# Patient Record
Sex: MALE | URBAN - METROPOLITAN AREA
[De-identification: names, ages, dates, MRNs, and addresses within clinical notes are randomized per-mention and may not be internally consistent; named-entity substitution may affect disease eponyms.]

---

## 2017-08-11 ENCOUNTER — EMERGENCY (EMERGENCY)
Facility: HOSPITAL | Age: 52
LOS: 0 days | Discharge: HOME | End: 2017-08-11
Attending: EMERGENCY MEDICINE
Payer: COMMERCIAL

## 2017-08-11 VITALS
TEMPERATURE: 99 F | WEIGHT: 190.04 LBS | HEART RATE: 73 BPM | HEIGHT: 71 IN | DIASTOLIC BLOOD PRESSURE: 77 MMHG | RESPIRATION RATE: 20 BRPM | OXYGEN SATURATION: 98 % | SYSTOLIC BLOOD PRESSURE: 111 MMHG
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
## 2017-08-11 DIAGNOSIS — Z87.891 PERSONAL HISTORY OF NICOTINE DEPENDENCE: ICD-10-CM

## 2017-08-11 DIAGNOSIS — M25.512 PAIN IN LEFT SHOULDER: ICD-10-CM

## 2017-08-11 DIAGNOSIS — S29.011A STRAIN OF MUSCLE AND TENDON OF FRONT WALL OF THORAX, INITIAL ENCOUNTER: ICD-10-CM
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
## 2017-08-11 DIAGNOSIS — M54.9 DORSALGIA, UNSPECIFIED: ICD-10-CM

## 2017-08-11 PROCEDURE — 99284 EMERGENCY DEPT VISIT MOD MDM: CPT

## 2017-08-11 PROCEDURE — 71020: CPT | Mod: 26

## 2017-08-11 RX ORDER — IBUPROFEN 200 MG
1 TABLET ORAL
Qty: 15 | Refills: 0
Start: 2017-08-11 | End: 2017-08-16

## 2017-08-11 RX ORDER — CYCLOBENZAPRINE HYDROCHLORIDE 10 MG/1
1 TABLET, FILM COATED ORAL
Qty: 20 | Refills: 0
Start: 2017-08-11 | End: 2017-08-21

## 2017-08-11 RX ORDER — IBUPROFEN 200 MG
800 TABLET ORAL ONCE
Refills: 0 | Status: COMPLETED | OUTPATIENT
Start: 2017-08-11 | End: 2017-08-11

## 2017-08-11 RX ADMIN — Medication 800 MILLIGRAM(S): at 16:14
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
## 2023-04-08 NOTE — ED ADULT NURSE NOTE - QUALITY
You can access the FollowMyHealth Patient Portal offered by [facility] by registering at the following website: [email or web address]. By joining [facility]’s FollowMyHealth portal, you will also be able to view your health information using other applications (apps) compatible with our system.
aching

## 2025-01-12 ENCOUNTER — INPATIENT (INPATIENT)
Facility: HOSPITAL | Age: 60
LOS: 3 days | Discharge: HOME | End: 2025-01-16
Attending: STUDENT IN AN ORGANIZED HEALTH CARE EDUCATION/TRAINING PROGRAM | Admitting: STUDENT IN AN ORGANIZED HEALTH CARE EDUCATION/TRAINING PROGRAM
Payer: MEDICAID

## 2025-01-12 VITALS
HEART RATE: 74 BPM | WEIGHT: 175.05 LBS | TEMPERATURE: 98 F | SYSTOLIC BLOOD PRESSURE: 208 MMHG | OXYGEN SATURATION: 98 % | DIASTOLIC BLOOD PRESSURE: 131 MMHG | RESPIRATION RATE: 18 BRPM | HEIGHT: 71 IN

## 2025-01-12 PROCEDURE — 99285 EMERGENCY DEPT VISIT HI MDM: CPT

## 2025-01-12 PROCEDURE — 93010 ELECTROCARDIOGRAM REPORT: CPT

## 2025-01-12 NOTE — ED ADULT TRIAGE NOTE - CHIEF COMPLAINT QUOTE
pt c/o left lower leg swelling x3 days.  pt states that he is on his feet all day for work.  pt noted to have elevated /131, denies any chest pain, blurry vision, headaches.  Ambulatory with steady gait  pt denies any medical hx. nkda

## 2025-01-13 DIAGNOSIS — E11.65 TYPE 2 DIABETES MELLITUS WITH HYPERGLYCEMIA: ICD-10-CM

## 2025-01-13 DIAGNOSIS — N17.9 ACUTE KIDNEY FAILURE, UNSPECIFIED: ICD-10-CM

## 2025-01-13 DIAGNOSIS — R79.89 OTHER SPECIFIED ABNORMAL FINDINGS OF BLOOD CHEMISTRY: ICD-10-CM

## 2025-01-13 LAB
ALBUMIN SERPL ELPH-MCNC: 3.4 G/DL — SIGNIFICANT CHANGE UP (ref 3.3–5)
ALP SERPL-CCNC: 162 U/L — HIGH (ref 40–120)
ALT FLD-CCNC: 44 U/L — SIGNIFICANT CHANGE UP (ref 12–78)
ANION GAP SERPL CALC-SCNC: 9 MMOL/L — SIGNIFICANT CHANGE UP (ref 5–17)
AST SERPL-CCNC: 32 U/L — SIGNIFICANT CHANGE UP (ref 15–37)
BASOPHILS # BLD AUTO: 0.02 K/UL — SIGNIFICANT CHANGE UP (ref 0–0.2)
BASOPHILS NFR BLD AUTO: 0.5 % — SIGNIFICANT CHANGE UP (ref 0–2)
BILIRUB SERPL-MCNC: 0.6 MG/DL — SIGNIFICANT CHANGE UP (ref 0.2–1.2)
BUN SERPL-MCNC: 32 MG/DL — HIGH (ref 7–23)
CALCIUM SERPL-MCNC: 9 MG/DL — SIGNIFICANT CHANGE UP (ref 8.5–10.1)
CHLORIDE SERPL-SCNC: 95 MMOL/L — LOW (ref 96–108)
CO2 SERPL-SCNC: 24 MMOL/L — SIGNIFICANT CHANGE UP (ref 22–31)
CREAT SERPL-MCNC: 2.15 MG/DL — HIGH (ref 0.5–1.3)
EGFR: 34 ML/MIN/1.73M2 — LOW
EOSINOPHIL # BLD AUTO: 0.02 K/UL — SIGNIFICANT CHANGE UP (ref 0–0.5)
EOSINOPHIL NFR BLD AUTO: 0.5 % — SIGNIFICANT CHANGE UP (ref 0–6)
GLUCOSE BLDC GLUCOMTR-MCNC: 119 MG/DL — HIGH (ref 70–99)
GLUCOSE BLDC GLUCOMTR-MCNC: 254 MG/DL — HIGH (ref 70–99)
GLUCOSE BLDC GLUCOMTR-MCNC: 259 MG/DL — HIGH (ref 70–99)
GLUCOSE BLDC GLUCOMTR-MCNC: 96 MG/DL — SIGNIFICANT CHANGE UP (ref 70–99)
GLUCOSE SERPL-MCNC: 581 MG/DL — CRITICAL HIGH (ref 70–99)
HCT VFR BLD CALC: 34.2 % — LOW (ref 39–50)
HGB BLD-MCNC: 12.5 G/DL — LOW (ref 13–17)
IMM GRANULOCYTES NFR BLD AUTO: 0 % — SIGNIFICANT CHANGE UP (ref 0–0.9)
LYMPHOCYTES # BLD AUTO: 1.44 K/UL — SIGNIFICANT CHANGE UP (ref 1–3.3)
LYMPHOCYTES # BLD AUTO: 34.6 % — SIGNIFICANT CHANGE UP (ref 13–44)
MCHC RBC-ENTMCNC: 30.4 PG — SIGNIFICANT CHANGE UP (ref 27–34)
MCHC RBC-ENTMCNC: 36.5 G/DL — HIGH (ref 32–36)
MCV RBC AUTO: 83.2 FL — SIGNIFICANT CHANGE UP (ref 80–100)
MONOCYTES # BLD AUTO: 0.4 K/UL — SIGNIFICANT CHANGE UP (ref 0–0.9)
MONOCYTES NFR BLD AUTO: 9.6 % — SIGNIFICANT CHANGE UP (ref 2–14)
NEUTROPHILS # BLD AUTO: 2.28 K/UL — SIGNIFICANT CHANGE UP (ref 1.8–7.4)
NEUTROPHILS NFR BLD AUTO: 54.8 % — SIGNIFICANT CHANGE UP (ref 43–77)
NRBC # BLD: 0 /100 WBCS — SIGNIFICANT CHANGE UP (ref 0–0)
NT-PROBNP SERPL-SCNC: 1011 PG/ML — HIGH (ref 0–125)
PLATELET # BLD AUTO: 252 K/UL — SIGNIFICANT CHANGE UP (ref 150–400)
POTASSIUM SERPL-MCNC: 3.9 MMOL/L — SIGNIFICANT CHANGE UP (ref 3.5–5.3)
POTASSIUM SERPL-SCNC: 3.9 MMOL/L — SIGNIFICANT CHANGE UP (ref 3.5–5.3)
PROT SERPL-MCNC: 7.8 GM/DL — SIGNIFICANT CHANGE UP (ref 6–8.3)
RBC # BLD: 4.11 M/UL — LOW (ref 4.2–5.8)
RBC # FLD: 11.8 % — SIGNIFICANT CHANGE UP (ref 10.3–14.5)
SODIUM SERPL-SCNC: 128 MMOL/L — LOW (ref 135–145)
TROPONIN I, HIGH SENSITIVITY RESULT: 28.1 NG/L — SIGNIFICANT CHANGE UP
WBC # BLD: 4.16 K/UL — SIGNIFICANT CHANGE UP (ref 3.8–10.5)
WBC # FLD AUTO: 4.16 K/UL — SIGNIFICANT CHANGE UP (ref 3.8–10.5)

## 2025-01-13 PROCEDURE — 99223 1ST HOSP IP/OBS HIGH 75: CPT

## 2025-01-13 PROCEDURE — 76770 US EXAM ABDO BACK WALL COMP: CPT | Mod: 26

## 2025-01-13 PROCEDURE — 93970 EXTREMITY STUDY: CPT | Mod: 26

## 2025-01-13 PROCEDURE — 93306 TTE W/DOPPLER COMPLETE: CPT | Mod: 26

## 2025-01-13 PROCEDURE — 99232 SBSQ HOSP IP/OBS MODERATE 35: CPT

## 2025-01-13 PROCEDURE — 71046 X-RAY EXAM CHEST 2 VIEWS: CPT | Mod: 26

## 2025-01-13 RX ORDER — DEXTROSE MONOHYDRATE 25 G/50ML
25 INJECTION, SOLUTION INTRAVENOUS ONCE
Refills: 0 | Status: DISCONTINUED | OUTPATIENT
Start: 2025-01-13 | End: 2025-01-16

## 2025-01-13 RX ORDER — GINKGO BILOBA 40 MG
3 CAPSULE ORAL AT BEDTIME
Refills: 0 | Status: DISCONTINUED | OUTPATIENT
Start: 2025-01-13 | End: 2025-01-16

## 2025-01-13 RX ORDER — INSULIN LISPRO 100/ML
VIAL (ML) SUBCUTANEOUS
Refills: 0 | Status: DISCONTINUED | OUTPATIENT
Start: 2025-01-13 | End: 2025-01-16

## 2025-01-13 RX ORDER — GLUCAGON INJECTION, SOLUTION 0.5 MG/.1ML
1 INJECTION, SOLUTION SUBCUTANEOUS ONCE
Refills: 0 | Status: DISCONTINUED | OUTPATIENT
Start: 2025-01-13 | End: 2025-01-16

## 2025-01-13 RX ORDER — SODIUM CHLORIDE 9 MG/ML
1000 INJECTION, SOLUTION INTRAMUSCULAR; INTRAVENOUS; SUBCUTANEOUS ONCE
Refills: 0 | Status: COMPLETED | OUTPATIENT
Start: 2025-01-13 | End: 2025-01-13

## 2025-01-13 RX ORDER — SODIUM CHLORIDE 9 MG/ML
1000 INJECTION, SOLUTION INTRAVENOUS
Refills: 0 | Status: DISCONTINUED | OUTPATIENT
Start: 2025-01-13 | End: 2025-01-16

## 2025-01-13 RX ORDER — ACETAMINOPHEN 80 MG/.8ML
650 SOLUTION/ DROPS ORAL EVERY 6 HOURS
Refills: 0 | Status: DISCONTINUED | OUTPATIENT
Start: 2025-01-13 | End: 2025-01-16

## 2025-01-13 RX ORDER — INSULIN GLARGINE-YFGN 100 [IU]/ML
10 INJECTION, SOLUTION SUBCUTANEOUS EVERY MORNING
Refills: 0 | Status: DISCONTINUED | OUTPATIENT
Start: 2025-01-14 | End: 2025-01-14

## 2025-01-13 RX ORDER — DEXTROSE MONOHYDRATE 25 G/50ML
12.5 INJECTION, SOLUTION INTRAVENOUS ONCE
Refills: 0 | Status: DISCONTINUED | OUTPATIENT
Start: 2025-01-13 | End: 2025-01-16

## 2025-01-13 RX ORDER — HEPARIN SODIUM 1000 [USP'U]/ML
5000 INJECTION, SOLUTION INTRAVENOUS; SUBCUTANEOUS EVERY 8 HOURS
Refills: 0 | Status: DISCONTINUED | OUTPATIENT
Start: 2025-01-13 | End: 2025-01-16

## 2025-01-13 RX ORDER — INSULIN LISPRO 100/ML
3 VIAL (ML) SUBCUTANEOUS
Refills: 0 | Status: DISCONTINUED | OUTPATIENT
Start: 2025-01-13 | End: 2025-01-14

## 2025-01-13 RX ORDER — INSULIN LISPRO 100/ML
VIAL (ML) SUBCUTANEOUS AT BEDTIME
Refills: 0 | Status: DISCONTINUED | OUTPATIENT
Start: 2025-01-13 | End: 2025-01-16

## 2025-01-13 RX ORDER — DEXTROSE MONOHYDRATE 25 G/50ML
15 INJECTION, SOLUTION INTRAVENOUS ONCE
Refills: 0 | Status: DISCONTINUED | OUTPATIENT
Start: 2025-01-13 | End: 2025-01-16

## 2025-01-13 RX ORDER — INSULIN LISPRO 100/ML
6 VIAL (ML) SUBCUTANEOUS ONCE
Refills: 0 | Status: DISCONTINUED | OUTPATIENT
Start: 2025-01-13 | End: 2025-01-13

## 2025-01-13 RX ORDER — INSULIN LISPRO 100/ML
10 VIAL (ML) SUBCUTANEOUS ONCE
Refills: 0 | Status: COMPLETED | OUTPATIENT
Start: 2025-01-13 | End: 2025-01-13

## 2025-01-13 RX ADMIN — ACETAMINOPHEN 650 MILLIGRAM(S): 80 SOLUTION/ DROPS ORAL at 18:49

## 2025-01-13 RX ADMIN — Medication 10 UNIT(S): at 04:25

## 2025-01-13 RX ADMIN — Medication 3: at 18:49

## 2025-01-13 RX ADMIN — Medication 5 MILLIGRAM(S): at 04:25

## 2025-01-13 RX ADMIN — SODIUM CHLORIDE 1000 MILLILITER(S): 9 INJECTION, SOLUTION INTRAMUSCULAR; INTRAVENOUS; SUBCUTANEOUS at 05:12

## 2025-01-13 RX ADMIN — SODIUM CHLORIDE 100 MILLILITER(S): 9 INJECTION, SOLUTION INTRAVENOUS at 23:37

## 2025-01-13 RX ADMIN — SODIUM CHLORIDE 100 MILLILITER(S): 9 INJECTION, SOLUTION INTRAVENOUS at 19:30

## 2025-01-13 RX ADMIN — Medication 5 MILLIGRAM(S): at 20:02

## 2025-01-13 RX ADMIN — Medication 1: at 23:37

## 2025-01-13 RX ADMIN — Medication 3 UNIT(S): at 18:48

## 2025-01-13 NOTE — H&P ADULT - PROBLEM SELECTOR PLAN 4
elevated BNP, LE edema  LE doppler with no DVT  Lungs clear  Check ECHO to evaluate for CHF  Monitor for volume overload

## 2025-01-13 NOTE — ED ADULT NURSE NOTE - ED STAT RN HANDOFF DETAILS 3
handoff report given to 1 B CONSTANTINO glass. All pending orders endorsed, safety precautions maintained.

## 2025-01-13 NOTE — ED ADULT NURSE NOTE - ED STAT RN HANDOFF DETAILS
Report endorsed to oncoming RN  norma for my break coverage. Report given to covering RN and patient informed during rounding Safety checks compld this shift/Safety rounds completed hourly.  Fall +skin precs in place. Any issues endorsed to oncoming RN for follow up. RN Assumed patient's care for coverage.

## 2025-01-13 NOTE — H&P ADULT - NSHPPHYSICALEXAM_GEN_ALL_CORE
CONSTITUTIONAL: alert and cooperative, no acute distress  EYES: PERRL,  no scleral icterus  ENT: Mucosa moist, tongue normal.  NECK: Neck supple, trachea midline, non-tender  CARDIAC: Normal S1 and S2. Regular rate and rhythms.  Pedal edema++  LUNGS: Equal air entry both lungs. No rales, rhonchi, wheezing. Normal respiratory effort.   ABDOMEN: Soft, nondistended, nontender. No guarding or rebound tenderness. No hepatomegaly or splenomegaly. Bowel sound normal  MUSCULOSKELETAL: Normocephalic, atraumatic. No significant deformity or joint abnormality  NEUROLOGICAL: No gross motor or sensory deficits.  SKIN: no lesions or eruptions. Normal turgor  PSYCHIATRIC: A&O x 3, appropriate mood and affect.

## 2025-01-13 NOTE — CONSULT NOTE ADULT - PROBLEM SELECTOR RECOMMENDATION 9
currently with better finger sticks   Continue with the current  regimen while inpatient   while inpatient, finger sticks should be 100-180   Encourage more nutrition   Thank You for the courtesy of this consultation !!!

## 2025-01-13 NOTE — ED ADULT NURSE NOTE - OBJECTIVE STATEMENT
Pt AAOx4. 59 year old male presents to ED with complaint of bilateral leg swelling that has been worsening over the past several days. Patient states he walks a lot at work and also about 5-6 miles daily.  No history of CHF or renal disease or DVTs.  Denies calf pain or fall/trauma. Swelling noted to bilateral lower extremities. Respirations equal and unlabored. No acute distress noted at this time.

## 2025-01-13 NOTE — H&P ADULT - PROBLEM SELECTOR PLAN 1
polydipsia, polyuria. Significantly elevated glucose of 581  Calculated serum osmolality 300  CXR  ( I personally review) no focal consolidation  New onset DM with hyperglycemia.  Significant improvement with insulin  continue with basal 10 unit, premeal 3 unit tid, ISS, hypoglycemia protocol  Check A1c  IV fluid. Closely monitor for volume overload  Endo consult---> Dr Chacon

## 2025-01-13 NOTE — PATIENT PROFILE ADULT - FALL HARM RISK - RISK INTERVENTIONS

## 2025-01-13 NOTE — ED PROVIDER NOTE - PHYSICAL EXAMINATION
On Physical Exam:  General: well appearing, in NAD, speaking clearly in full sentences and without difficulty; cooperative with exam  HEENT: anicteric sclera, airway patent  Neck: no neck tenderness, no nuchal rigidity  Cardiac: normal s1, s2; RRR; no MGR  Lungs: CTABL  Abdomen: soft nontender/nondistended  : no bladder tenderness or distension  Skin: intact, no rash  Extremities: 2+ bilateral pitting peripheral edema up to pretibial region, L worse than R, no gross deformities; dp/pt pulses palpable bilaterally, no rashes

## 2025-01-13 NOTE — ED ADULT NURSE NOTE - ED STAT RN HANDOFF DETAILS 2
Report endorsed to oncoming RN can . Safety checks compld this shift/Safety rounds completed hourly.  IV sites checked Q2+remains WDL. Meds given as ord with no s/s of adverse RXNs. Fall +skin precs in place. Any issues endorsed to oncoming RN for follow up.

## 2025-01-13 NOTE — H&P ADULT - PROBLEM SELECTOR PLAN 3
Hypertensive to 201/101, improvement with oral amlodipine  Continue amlodipine 5mg daily  Monitor BP and titrate BP med

## 2025-01-13 NOTE — ED PROVIDER NOTE - OBJECTIVE STATEMENT
Attending note (León): 59-year-old male history of borderline diabetes presenting with worsening swelling of bilateral lower extremities over past several days worse with standing.  No history of CHF or renal disease or DVTs.  Denies calf pain or fall/trauma.

## 2025-01-13 NOTE — H&P ADULT - ASSESSMENT
59 years old male with h/o prediabetes present to ED with complain of generalized weakness, polydipsia, polyuria and LE swelling. Patient reported polydipsia and polyuria for 2 weeks, associated with LE swelling. No orthopnea. No chest pain or SOB  Hypertensive to 201/101, afebrile, sat well at RA. No leukocytosis, plt 252, glucose 581, Cr 2.15, Na 128 ( corrected Na for hyperglycemia 140), proBNP 1011. CXR with no focal consolidation. LE doppler with no DVT. EKG with NSR, QTc 406

## 2025-01-13 NOTE — CONSULT NOTE ADULT - SUBJECTIVE AND OBJECTIVE BOX
The patient should be admitted to hospital medicine (discussed with Joyce).
  Patient is a 59y old  Male who presents with a chief complaint of DM with hyperglycemia, OLGA, hypertensive urgency (13 Jan 2025 12:56)      Reason For Consult: hyperglycemia      HPI:  59 years old male with h/o prediabetes present to ED with complain of generalized weakness, polydipsia, polyuria and LE swelling. Patient reported polydipsia and polyuria for 2 weeks, associated with LE swelling. No orthopnea. No chest pain or SOB  Hypertensive to 201/101, afebrile, sat well at RA. No leukocytosis, plt 252, glucose 581, Cr 2.15, Na 128 ( corrected Na for hyperglycemia 140), proBNP 1011. CXR with no focal consolidation. LE doppler with no DVT. EKG with NSR, QTc 406   (13 Jan 2025 12:56)  Patient has new onset diabetes, HbA1c not available.  Currently put on 10 units of Lantus and 3 units of prandial lispro.  Currently her fingersticks were >400 but upon increased insulin administration hypoglycemia ensued with fingersticks dropping to 80 and below.  Currently with his current regimen fingersticks are in the 100s    PAST MEDICAL & SURGICAL HISTORY:  No pertinent past medical history          FAMILY HISTORY:        Social History:    MEDICATIONS  (STANDING):  amLODIPine   Tablet 5 milliGRAM(s) Oral daily  dextrose 5%. 1000 milliLiter(s) (100 mL/Hr) IV Continuous <Continuous>  dextrose 5%. 1000 milliLiter(s) (50 mL/Hr) IV Continuous <Continuous>  dextrose 50% Injectable 25 Gram(s) IV Push once  dextrose 50% Injectable 12.5 Gram(s) IV Push once  dextrose 50% Injectable 25 Gram(s) IV Push once  glucagon  Injectable 1 milliGRAM(s) IntraMuscular once  heparin   Injectable 5000 Unit(s) SubCutaneous every 8 hours  insulin lispro (ADMELOG) corrective regimen sliding scale   SubCutaneous three times a day before meals  insulin lispro (ADMELOG) corrective regimen sliding scale   SubCutaneous at bedtime  insulin lispro Injectable (ADMELOG) 3 Unit(s) SubCutaneous three times a day before meals  lactated ringers. 1000 milliLiter(s) (100 mL/Hr) IV Continuous <Continuous>    MEDICATIONS  (PRN):  acetaminophen     Tablet .. 650 milliGRAM(s) Oral every 6 hours PRN Mild Pain (1 - 3), Moderate Pain (4 - 6)  dextrose Oral Gel 15 Gram(s) Oral once PRN Blood Glucose LESS THAN 70 milliGRAM(s)/deciliter  melatonin 3 milliGRAM(s) Oral at bedtime PRN Insomnia      REVIEW OF SYSTEMS:  CONSTITUTIONAL:  as per HPI  HEENT:  Eyes:  No diplopia or blurred vision.   ENT:  No earache, sore throat or runny nose.  CARDIOVASCULAR:  No chest pain .  RESPIRATORY:  No cough, shortness of breath, PND or orthopnea.  GASTROINTESTINAL:  No nausea, vomiting or diarrhea.  GENITOURINARY:  No dysuria, frequency or urgency. No Blood in urine  MUSCULOSKELETAL:  no joint aches, no muscle pain, myalgia  SKIN:  No change in skin, hair or nails.  NEUROLOGIC:  No paresthesias, fasciculations, seizures or weakness.  PSYCHIATRIC:  No disorder of thought or mood.  ENDOCRINE:  No heat or cold intolerance, polyuria or polydipsia. abnormal weight gain or loss, oral thrush  HEMATOLOGICAL:  No easy bruising or bleeding.     T(C): 36.5 (01-13-25 @ 21:25), Max: 36.7 (01-13-25 @ 04:29)  HR: 79 (01-13-25 @ 22:17) (58 - 85)  BP: 173/94 (01-13-25 @ 22:17) (150/74 - 201/101)  RR: 18 (01-13-25 @ 21:25) (17 - 18)  SpO2: 100% (01-13-25 @ 21:25) (96% - 100%)  Wt(kg): --    PHYSICAL EXAM:  GENERAL: NAD, well-groomed, well-developed  HEAD:  Atraumatic, Normocephalic  EYES: PERRLA, conjunctiva and sclera clear  ENMT: No  exudates,, Moist mucous membranes,, No lesions  NECK: Supple, No JVD,   NERVOUS SYSTEM:  Alert & Oriented   CHEST/LUNG: Clear to percussion bilaterally; No rales, rhonchi, wheezing, or rubs  HEART: Regular rate and rhythm; No murmurs, rubs, or gallops  ABDOMEN: Soft, Nontender, Nondistended; Bowel sounds present  EXTREMITIES:  2+ Peripheral Pulses, No clubbing, cyanosis, or edema  LYMPH: No lymphadenopathy noted  SKIN: No rashes or lesions    CAPILLARY BLOOD GLUCOSE      POCT Blood Glucose.: 254 mg/dL (13 Jan 2025 22:07)  POCT Blood Glucose.: 259 mg/dL (13 Jan 2025 18:31)  POCT Blood Glucose.: 119 mg/dL (13 Jan 2025 12:35)  POCT Blood Glucose.: 96 mg/dL (13 Jan 2025 09:58)  POCT Blood Glucose.: 574 mg/dL (13 Jan 2025 03:53)                            12.5   4.16  )-----------( 252      ( 13 Jan 2025 02:47 )             34.2       CMP:  01-13 @ 02:47  SGPT 44  Albumin 3.4   Alk Phos 162   Anion Gap 9   SGOT 32   Total Bili 0.6   BUN 32   Calcium Total 9.0   CO2 24   Chloride 95   Creatinine 2.15   eGFR if AA --   eGFR if non AA --   Glucose 581   Potassium 3.9   Protein 7.8   Sodium 128      Thyroid Function Tests:      Diabetes Tests:     Parathyroids:     Adrenals:       Radiology:

## 2025-01-13 NOTE — ED PROVIDER NOTE - CLINICAL SUMMARY MEDICAL DECISION MAKING FREE TEXT BOX
Attending note (León): bilateral LE swelling, no prior h/o DVT/CKD/CHF; obtain screening labs cbc/cmp (low suspicion infection, more likely vascular insuffiencey, renal dysfunction, less likely HF (no JVD or rales); lower suspicion DVT (obtain US r/o dvt); likely dc. Attending note (León): bilateral LE swelling, no prior h/o DVT/CKD/CHF; obtain screening labs cbc/cmp (low suspicion infection, more likely vascular insuffiencey, renal dysfunction, less likely HF (no JVD or rales); lower suspicion DVT (obtain US r/o dvt); likely dc.    ED Course: Labs reviewed patient noted to have mild hyponatremia of though glucose is 581.  Significant hyperglycemia but anion gap 9, not consistent with DKA.  Also noted to have mildly elevated proBNP but evidence for OLGA with BUN 32 and creatinine 2.15 with no recent baseline.  Given worsening swelling concern that this could be renally remediated though cannot exclude element of heart failure.  Ultrasound does not show evidence of DVT in either lower extremity.  At this time will trial fluid bolus given hyperglycemia and OLGA.  Will also amlodipine for blood pressure and have a log 6 units, though likely will require additional medications.  Given degree of derangements limited follow-up evidence for acute renal injury we will plan for admission for blood pressure and glycemic control.

## 2025-01-13 NOTE — ED ADULT NURSE NOTE - NSFALLUNIVINTERV_ED_ALL_ED
Bed/Stretcher in lowest position, wheels locked, appropriate side rails in place/Call bell, personal items and telephone in reach/Instruct patient to call for assistance before getting out of bed/chair/stretcher/Non-slip footwear applied when patient is off stretcher/[place] to call system/Physically safe environment - no spills, clutter or unnecessary equipment/Purposeful proactive rounding/Room/bathroom lighting operational, light cord in reach

## 2025-01-13 NOTE — H&P ADULT - PROBLEM SELECTOR PLAN 2
Cr 2.15. No prior renal function on file  Likely pre-renal  Gentle IV fluid  Closely monitor renal function. Closely monitor for volume overload  check urine lytes, kidney/bladder ultrasound, protein/Cr ratio, UA

## 2025-01-14 LAB
ALBUMIN SERPL ELPH-MCNC: 3.3 G/DL — SIGNIFICANT CHANGE UP (ref 3.3–5)
ALP SERPL-CCNC: 148 U/L — HIGH (ref 40–120)
ALT FLD-CCNC: 37 U/L — SIGNIFICANT CHANGE UP (ref 12–78)
ANION GAP SERPL CALC-SCNC: 9 MMOL/L — SIGNIFICANT CHANGE UP (ref 5–17)
APPEARANCE UR: CLEAR — SIGNIFICANT CHANGE UP
AST SERPL-CCNC: 31 U/L — SIGNIFICANT CHANGE UP (ref 15–37)
BACTERIA # UR AUTO: ABNORMAL /HPF
BILIRUB SERPL-MCNC: 0.6 MG/DL — SIGNIFICANT CHANGE UP (ref 0.2–1.2)
BILIRUB UR-MCNC: NEGATIVE — SIGNIFICANT CHANGE UP
BUN SERPL-MCNC: 32 MG/DL — HIGH (ref 7–23)
CALCIUM SERPL-MCNC: 9.7 MG/DL — SIGNIFICANT CHANGE UP (ref 8.5–10.1)
CHLORIDE SERPL-SCNC: 102 MMOL/L — SIGNIFICANT CHANGE UP (ref 96–108)
CHOLEST SERPL-MCNC: 181 MG/DL — SIGNIFICANT CHANGE UP
CO2 SERPL-SCNC: 24 MMOL/L — SIGNIFICANT CHANGE UP (ref 22–31)
COLOR SPEC: YELLOW — SIGNIFICANT CHANGE UP
CREAT SERPL-MCNC: 1.99 MG/DL — HIGH (ref 0.5–1.3)
DIFF PNL FLD: NEGATIVE — SIGNIFICANT CHANGE UP
EGFR: 38 ML/MIN/1.73M2 — LOW
EPI CELLS # UR: PRESENT
GLUCOSE BLDC GLUCOMTR-MCNC: 241 MG/DL — HIGH (ref 70–99)
GLUCOSE BLDC GLUCOMTR-MCNC: 294 MG/DL — HIGH (ref 70–99)
GLUCOSE BLDC GLUCOMTR-MCNC: 319 MG/DL — HIGH (ref 70–99)
GLUCOSE BLDC GLUCOMTR-MCNC: 360 MG/DL — HIGH (ref 70–99)
GLUCOSE SERPL-MCNC: 266 MG/DL — HIGH (ref 70–99)
GLUCOSE UR QL: 500 MG/DL
HCT VFR BLD CALC: 36.9 % — LOW (ref 39–50)
HDLC SERPL-MCNC: 68 MG/DL — SIGNIFICANT CHANGE UP
HGB BLD-MCNC: 13.4 G/DL — SIGNIFICANT CHANGE UP (ref 13–17)
KETONES UR-MCNC: NEGATIVE MG/DL — SIGNIFICANT CHANGE UP
LEUKOCYTE ESTERASE UR-ACNC: NEGATIVE — SIGNIFICANT CHANGE UP
LIPID PNL WITH DIRECT LDL SERPL: 93 MG/DL — SIGNIFICANT CHANGE UP
MAGNESIUM SERPL-MCNC: 1.8 MG/DL — SIGNIFICANT CHANGE UP (ref 1.6–2.6)
MCHC RBC-ENTMCNC: 30.9 PG — SIGNIFICANT CHANGE UP (ref 27–34)
MCHC RBC-ENTMCNC: 36.3 G/DL — HIGH (ref 32–36)
MCV RBC AUTO: 85 FL — SIGNIFICANT CHANGE UP (ref 80–100)
NITRITE UR-MCNC: NEGATIVE — SIGNIFICANT CHANGE UP
NON HDL CHOLESTEROL: 113 MG/DL — SIGNIFICANT CHANGE UP
NRBC # BLD: 0 /100 WBCS — SIGNIFICANT CHANGE UP (ref 0–0)
PH UR: 6.5 — SIGNIFICANT CHANGE UP (ref 5–8)
PHOSPHATE SERPL-MCNC: 3.3 MG/DL — SIGNIFICANT CHANGE UP (ref 2.5–4.5)
PLATELET # BLD AUTO: 301 K/UL — SIGNIFICANT CHANGE UP (ref 150–400)
POTASSIUM SERPL-MCNC: 3.5 MMOL/L — SIGNIFICANT CHANGE UP (ref 3.5–5.3)
POTASSIUM SERPL-SCNC: 3.5 MMOL/L — SIGNIFICANT CHANGE UP (ref 3.5–5.3)
PROT SERPL-MCNC: 7.7 GM/DL — SIGNIFICANT CHANGE UP (ref 6–8.3)
PROT UR-MCNC: SIGNIFICANT CHANGE UP MG/DL
RBC # BLD: 4.34 M/UL — SIGNIFICANT CHANGE UP (ref 4.2–5.8)
RBC # FLD: 12 % — SIGNIFICANT CHANGE UP (ref 10.3–14.5)
RBC CASTS # UR COMP ASSIST: 1 /HPF — SIGNIFICANT CHANGE UP (ref 0–4)
SODIUM SERPL-SCNC: 135 MMOL/L — SIGNIFICANT CHANGE UP (ref 135–145)
SP GR SPEC: 1.01 — SIGNIFICANT CHANGE UP (ref 1–1.03)
TRIGL SERPL-MCNC: 111 MG/DL — SIGNIFICANT CHANGE UP
UROBILINOGEN FLD QL: 0.2 MG/DL — SIGNIFICANT CHANGE UP (ref 0.2–1)
WBC # BLD: 4.61 K/UL — SIGNIFICANT CHANGE UP (ref 3.8–10.5)
WBC # FLD AUTO: 4.61 K/UL — SIGNIFICANT CHANGE UP (ref 3.8–10.5)
WBC UR QL: 1 /HPF — SIGNIFICANT CHANGE UP (ref 0–5)

## 2025-01-14 PROCEDURE — 99232 SBSQ HOSP IP/OBS MODERATE 35: CPT

## 2025-01-14 RX ORDER — MORPHINE SULFATE 15 MG
2 TABLET, EXTENDED RELEASE ORAL ONCE
Refills: 0 | Status: DISCONTINUED | OUTPATIENT
Start: 2025-01-14 | End: 2025-01-14

## 2025-01-14 RX ORDER — NIFEDIPINE 60 MG/1
60 TABLET, EXTENDED RELEASE ORAL DAILY
Refills: 0 | Status: DISCONTINUED | OUTPATIENT
Start: 2025-01-14 | End: 2025-01-16

## 2025-01-14 RX ORDER — INSULIN GLARGINE-YFGN 100 [IU]/ML
13 INJECTION, SOLUTION SUBCUTANEOUS EVERY MORNING
Refills: 0 | Status: DISCONTINUED | OUTPATIENT
Start: 2025-01-15 | End: 2025-01-16

## 2025-01-14 RX ORDER — INSULIN LISPRO 100/ML
6 VIAL (ML) SUBCUTANEOUS
Refills: 0 | Status: DISCONTINUED | OUTPATIENT
Start: 2025-01-14 | End: 2025-01-16

## 2025-01-14 RX ORDER — LIDOCAINE 50 MG/G
1 OINTMENT TOPICAL ONCE
Refills: 0 | Status: COMPLETED | OUTPATIENT
Start: 2025-01-14 | End: 2025-01-14

## 2025-01-14 RX ADMIN — INSULIN GLARGINE-YFGN 10 UNIT(S): 100 INJECTION, SOLUTION SUBCUTANEOUS at 08:15

## 2025-01-14 RX ADMIN — Medication 5 MILLIGRAM(S): at 06:15

## 2025-01-14 RX ADMIN — Medication 2 MILLIGRAM(S): at 21:04

## 2025-01-14 RX ADMIN — Medication 1: at 21:06

## 2025-01-14 RX ADMIN — ACETAMINOPHEN 650 MILLIGRAM(S): 80 SOLUTION/ DROPS ORAL at 02:01

## 2025-01-14 RX ADMIN — LIDOCAINE 1 PATCH: 50 OINTMENT TOPICAL at 19:15

## 2025-01-14 RX ADMIN — ACETAMINOPHEN 650 MILLIGRAM(S): 80 SOLUTION/ DROPS ORAL at 17:03

## 2025-01-14 RX ADMIN — NIFEDIPINE 60 MILLIGRAM(S): 60 TABLET, EXTENDED RELEASE ORAL at 13:05

## 2025-01-14 RX ADMIN — Medication 5: at 16:28

## 2025-01-14 RX ADMIN — Medication 6 UNIT(S): at 16:28

## 2025-01-14 RX ADMIN — Medication 2 MILLIGRAM(S): at 21:34

## 2025-01-14 RX ADMIN — Medication 3 UNIT(S): at 08:20

## 2025-01-14 RX ADMIN — Medication 2: at 08:21

## 2025-01-14 RX ADMIN — ACETAMINOPHEN 650 MILLIGRAM(S): 80 SOLUTION/ DROPS ORAL at 01:01

## 2025-01-14 RX ADMIN — Medication 4: at 11:28

## 2025-01-14 RX ADMIN — Medication 3 UNIT(S): at 11:27

## 2025-01-14 RX ADMIN — ACETAMINOPHEN 650 MILLIGRAM(S): 80 SOLUTION/ DROPS ORAL at 18:26

## 2025-01-14 NOTE — PROGRESS NOTE ADULT - SUBJECTIVE AND OBJECTIVE BOX
Patient is a 59y old  Male who presents with a chief complaint of DM with hyperglycemia, OLGA, hypertensive urgency (2025 17:51)      INTERVAL HPI/OVERNIGHT EVENTS: no events noted overnight. Patient's BP was still elevated. blood sugars uncontrolled.     MEDICATIONS  (STANDING):  dextrose 5%. 1000 milliLiter(s) (100 mL/Hr) IV Continuous <Continuous>  dextrose 5%. 1000 milliLiter(s) (50 mL/Hr) IV Continuous <Continuous>  dextrose 50% Injectable 25 Gram(s) IV Push once  dextrose 50% Injectable 12.5 Gram(s) IV Push once  dextrose 50% Injectable 25 Gram(s) IV Push once  glucagon  Injectable 1 milliGRAM(s) IntraMuscular once  heparin   Injectable 5000 Unit(s) SubCutaneous every 8 hours  insulin lispro (ADMELOG) corrective regimen sliding scale   SubCutaneous three times a day before meals  insulin lispro (ADMELOG) corrective regimen sliding scale   SubCutaneous at bedtime  insulin lispro Injectable (ADMELOG) 6 Unit(s) SubCutaneous three times a day before meals  lactated ringers. 1000 milliLiter(s) (100 mL/Hr) IV Continuous <Continuous>  NIFEdipine XL 60 milliGRAM(s) Oral daily    MEDICATIONS  (PRN):  acetaminophen     Tablet .. 650 milliGRAM(s) Oral every 6 hours PRN Mild Pain (1 - 3), Moderate Pain (4 - 6)  dextrose Oral Gel 15 Gram(s) Oral once PRN Blood Glucose LESS THAN 70 milliGRAM(s)/deciliter  melatonin 3 milliGRAM(s) Oral at bedtime PRN Insomnia      __________________________________________________  REVIEW OF SYSTEMS:    CONSTITUTIONAL: No fever,   EYES: no acute visual disturbances  NECK: No pain or stiffness  RESPIRATORY: No cough; No shortness of breath  CARDIOVASCULAR: No chest pain, no palpitations  GASTROINTESTINAL: No pain. No nausea or vomiting; No diarrhea   NEUROLOGICAL: No headache or numbness, no tremors  MUSCULOSKELETAL: No joint pain, no muscle pain  GENITOURINARY: no dysuria, no frequency, no hesitancy  PSYCHIATRY: no depression , no anxiety  ALL OTHER  ROS negative        Vital Signs Last 24 Hrs  T(C): 36.4 (2025 10:43), Max: 37.2 (2025 23:51)  T(F): 97.6 (2025 10:43), Max: 98.9 (2025 23:51)  HR: 73 (2025 10:43) (69 - 85)  BP: 179/81 (2025 10:43) (167/82 - 200/118)  BP(mean): --  RR: 17 (2025 10:43) (17 - 18)  SpO2: 100% (2025 10:43) (96% - 100%)    Parameters below as of 2025 10:43  Patient On (Oxygen Delivery Method): room air        ________________________________________________  PHYSICAL EXAM:  GENERAL: NAD  HEENT: Normocephalic;  conjunctivae and sclerae clear; moist mucous membranes;   NECK : supple  CHEST/LUNG: Clear to auscultation bilaterally with good air entry   HEART: S1 S2  regular; no murmurs, gallops or rubs  ABDOMEN: Soft, Nontender, Nondistended; Bowel sounds present  EXTREMITIES: no cyanosis; no edema; no calf tenderness  SKIN: warm and dry; no rash  NERVOUS SYSTEM:  Awake and alert; Oriented  to place, person and time ; no new deficits    _________________________________________________  LABS:                        13.4   4.61  )-----------( 301      ( 2025 08:30 )             36.9     01-14    135  |  102  |  32[H]  ----------------------------<  266[H]  3.5   |  24  |  1.99[H]    Ca    9.7      2025 08:30  Phos  3.3     01-14  Mg     1.8     -14    TPro  7.7  /  Alb  3.3  /  TBili  0.6  /  DBili  x   /  AST  31  /  ALT  37  /  AlkPhos  148[H]  -14      Urinalysis Basic - ( 2025 08:56 )    Color: Yellow / Appearance: Clear / S.010 / pH: x  Gluc: x / Ketone: Negative mg/dL  / Bili: Negative / Urobili: 0.2 mg/dL   Blood: x / Protein: Trace mg/dL / Nitrite: Negative   Leuk Esterase: Negative / RBC: 1 /HPF / WBC 1 /HPF   Sq Epi: x / Non Sq Epi: x / Bacteria: Few /HPF      CAPILLARY BLOOD GLUCOSE      POCT Blood Glucose.: 319 mg/dL (2025 11:07)  POCT Blood Glucose.: 241 mg/dL (2025 07:36)  POCT Blood Glucose.: 254 mg/dL (2025 22:07)  POCT Blood Glucose.: 259 mg/dL (2025 18:31)        RADIOLOGY & ADDITIONAL TESTS:    Imaging Personally Reviewed:  YES    Consultant(s) Notes Reviewed:   YES    Care Discussed with Consultants : YES     Plan of care was discussed with patient and /or primary care giver; all questions and concerns were addressed and care was aligned with patient's wishes.

## 2025-01-14 NOTE — PROGRESS NOTE ADULT - SUBJECTIVE AND OBJECTIVE BOX
Patient is a 59y old  Male who presents with a chief complaint of DM with hyperglycemia, OLGA, hypertensive urgency (2025 13:46)      Interval History:    MEDICATIONS  (STANDING):  dextrose 5%. 1000 milliLiter(s) (100 mL/Hr) IV Continuous <Continuous>  dextrose 5%. 1000 milliLiter(s) (50 mL/Hr) IV Continuous <Continuous>  dextrose 50% Injectable 25 Gram(s) IV Push once  dextrose 50% Injectable 12.5 Gram(s) IV Push once  dextrose 50% Injectable 25 Gram(s) IV Push once  glucagon  Injectable 1 milliGRAM(s) IntraMuscular once  heparin   Injectable 5000 Unit(s) SubCutaneous every 8 hours  insulin lispro (ADMELOG) corrective regimen sliding scale   SubCutaneous three times a day before meals  insulin lispro (ADMELOG) corrective regimen sliding scale   SubCutaneous at bedtime  insulin lispro Injectable (ADMELOG) 6 Unit(s) SubCutaneous three times a day before meals  lactated ringers. 1000 milliLiter(s) (100 mL/Hr) IV Continuous <Continuous>  NIFEdipine XL 60 milliGRAM(s) Oral daily    MEDICATIONS  (PRN):  acetaminophen     Tablet .. 650 milliGRAM(s) Oral every 6 hours PRN Mild Pain (1 - 3), Moderate Pain (4 - 6)  dextrose Oral Gel 15 Gram(s) Oral once PRN Blood Glucose LESS THAN 70 milliGRAM(s)/deciliter  melatonin 3 milliGRAM(s) Oral at bedtime PRN Insomnia      Allergies    No Known Allergies    Intolerances        REVIEW OF SYSTEMS:  CONSTITUTIONAL: no changes  EYES: No eye pain, visual disturbances, or discharge  ENMT:  No difficulty hearing, No sinus or throat pain  NECK: No pain or stiffness  RESPIRATORY: No cough, wheezing, chills or hemoptysis; No shortness of breath  CARDIOVASCULAR: No chest pain, palpitations or leg swelling  GASTROINTESTINAL: No abdominal or epigastric pain. No nausea, vomiting, or hematemesis; No diarrhea or constipation. No melena or hematochezia.  GENITOURINARY: No dysuria, frequency, hematuria, or incontinence  NEUROLOGICAL: No headaches, memory loss, loss of strength, numbness, or tremors  SKIN: No itching, burning, rashes, or lesions   ENDOCRINE: No heat or cold intolerance; No hair loss  MUSCULOSKELETAL: No joint pain or swelling; No muscle, back, or extremity pain  PSYCHIATRIC: No depression, anxiety, mood swings, or difficulty sleeping  HEME/LYMPH: No easy bruising, or bleeding gums  ALLERY AND IMMUNOLOGIC: No hives or eczema    Vital Signs Last 24 Hrs  T(C): 37.7 (2025 17:18), Max: 37.7 (2025 17:18)  T(F): 99.8 (2025 17:18), Max: 99.8 (2025 17:18)  HR: 81 (2025 17:18) (69 - 81)  BP: 158/85 (2025 17:18) (158/85 - 179/81)  BP(mean): --  RR: 18 (2025 17:18) (17 - 18)  SpO2: 99% (2025 17:18) (99% - 100%)    Parameters below as of 2025 17:18  Patient On (Oxygen Delivery Method): room air        PHYSICAL EXAM:  GENERAL:   HEAD: Atraumatic, Normocephalic  EYES: PERRLA, conjunctiva and sclera clear  ENMT: No  exudates,; Moist mucous membranes,, No lesions  NECK: Supple, No JVD, Normal thyroid  NERVOUS SYSTEM:  Alert & Oriented,   CHEST/LUNG: Clear to auscultation bilaterally; No rales, rhonchi, wheezing, or rubs  HEART: Regular rate and rhythm; No murmurs, rubs, or gallops  ABDOMEN: Soft, Nontender, Nondistended; Bowel sounds present  EXTREMITIES:  2+ Peripheral Pulses, no edema  SKIN: No rashes or lesions    LABS:      Urinalysis Basic - ( 2025 08:56 )    Color: Yellow / Appearance: Clear / S.010 / pH: x  Gluc: x / Ketone: Negative mg/dL  / Bili: Negative / Urobili: 0.2 mg/dL   Blood: x / Protein: Trace mg/dL / Nitrite: Negative   Leuk Esterase: Negative / RBC: 1 /HPF / WBC 1 /HPF   Sq Epi: x / Non Sq Epi: x / Bacteria: Few /HPF      CAPILLARY BLOOD GLUCOSE      POCT Blood Glucose.: 294 mg/dL (2025 21:03)  POCT Blood Glucose.: 360 mg/dL (2025 16:16)  POCT Blood Glucose.: 319 mg/dL (2025 11:07)  POCT Blood Glucose.: 241 mg/dL (2025 07:36)  POCT Blood Glucose.: 254 mg/dL (2025 22:07)    Lipid panel:           Thyroid:  Diabetes Tests:  Parathyroid Panel:  Adrenals:  RADIOLOGY & ADDITIONAL TESTS:    Imaging Personally Reviewed:  [ ] YES  [ ] NO    Consultant(s) Notes Reviewed:  [ ] YES  [ ] NO    Care Discussed with Consultants/Other Providers [ ] YES  [ ] NO Patient is a 59y old  Male who presents with a chief complaint of DM with hyperglycemia, OLGA, hypertensive urgency (2025 13:46)      Interval History: Fingersticks continue to increase.  Patient is on 13 units of Lantus and 6 units of prandial lispro with increased insulin resistance    MEDICATIONS  (STANDING):  dextrose 5%. 1000 milliLiter(s) (100 mL/Hr) IV Continuous <Continuous>  dextrose 5%. 1000 milliLiter(s) (50 mL/Hr) IV Continuous <Continuous>  dextrose 50% Injectable 25 Gram(s) IV Push once  dextrose 50% Injectable 12.5 Gram(s) IV Push once  dextrose 50% Injectable 25 Gram(s) IV Push once  glucagon  Injectable 1 milliGRAM(s) IntraMuscular once  heparin   Injectable 5000 Unit(s) SubCutaneous every 8 hours  insulin lispro (ADMELOG) corrective regimen sliding scale   SubCutaneous three times a day before meals  insulin lispro (ADMELOG) corrective regimen sliding scale   SubCutaneous at bedtime  insulin lispro Injectable (ADMELOG) 6 Unit(s) SubCutaneous three times a day before meals  lactated ringers. 1000 milliLiter(s) (100 mL/Hr) IV Continuous <Continuous>  NIFEdipine XL 60 milliGRAM(s) Oral daily    MEDICATIONS  (PRN):  acetaminophen     Tablet .. 650 milliGRAM(s) Oral every 6 hours PRN Mild Pain (1 - 3), Moderate Pain (4 - 6)  dextrose Oral Gel 15 Gram(s) Oral once PRN Blood Glucose LESS THAN 70 milliGRAM(s)/deciliter  melatonin 3 milliGRAM(s) Oral at bedtime PRN Insomnia      Allergies    No Known Allergies    Intolerances        REVIEW OF SYSTEMS:  CONSTITUTIONAL: no changes  EYES: No eye pain, visual disturbances, or discharge  ENMT:  No difficulty hearing, No sinus or throat pain  NECK: No pain or stiffness  RESPIRATORY: No cough, wheezing, chills or hemoptysis; No shortness of breath  CARDIOVASCULAR: No chest pain, palpitations or leg swelling  GASTROINTESTINAL: No abdominal or epigastric pain. No nausea, vomiting, or hematemesis; No diarrhea or constipation. No melena or hematochezia.  GENITOURINARY: No dysuria, frequency, hematuria, or incontinence  NEUROLOGICAL: No headaches, memory loss, loss of strength, numbness, or tremors  SKIN: No itching, burning, rashes, or lesions   ENDOCRINE: No heat or cold intolerance; No hair loss  MUSCULOSKELETAL: No joint pain or swelling; No muscle, back, or extremity pain  PSYCHIATRIC: No depression, anxiety, mood swings, or difficulty sleeping  HEME/LYMPH: No easy bruising, or bleeding gums  ALLERY AND IMMUNOLOGIC: No hives or eczema    Vital Signs Last 24 Hrs  T(C): 37.7 (2025 17:18), Max: 37.7 (2025 17:18)  T(F): 99.8 (2025 17:18), Max: 99.8 (2025 17:18)  HR: 81 (2025 17:18) (69 - 81)  BP: 158/85 (2025 17:18) (158/85 - 179/81)  BP(mean): --  RR: 18 (2025 17:18) (17 - 18)  SpO2: 99% (2025 17:18) (99% - 100%)    Parameters below as of 2025 17:18  Patient On (Oxygen Delivery Method): room air        PHYSICAL EXAM:  GENERAL:   HEAD: Atraumatic, Normocephalic  EYES: PERRLA, conjunctiva and sclera clear  ENMT: No  exudates,; Moist mucous membranes,, No lesions  NECK: Supple, No JVD, Normal thyroid  NERVOUS SYSTEM:  Alert & Oriented,   CHEST/LUNG: Clear to auscultation bilaterally; No rales, rhonchi, wheezing, or rubs  HEART: Regular rate and rhythm; No murmurs, rubs, or gallops  ABDOMEN: Soft, Nontender, Nondistended; Bowel sounds present  EXTREMITIES:  2+ Peripheral Pulses, no edema  SKIN: No rashes or lesions    LABS:      Urinalysis Basic - ( 2025 08:56 )    Color: Yellow / Appearance: Clear / S.010 / pH: x  Gluc: x / Ketone: Negative mg/dL  / Bili: Negative / Urobili: 0.2 mg/dL   Blood: x / Protein: Trace mg/dL / Nitrite: Negative   Leuk Esterase: Negative / RBC: 1 /HPF / WBC 1 /HPF   Sq Epi: x / Non Sq Epi: x / Bacteria: Few /HPF      CAPILLARY BLOOD GLUCOSE      POCT Blood Glucose.: 294 mg/dL (2025 21:03)  POCT Blood Glucose.: 360 mg/dL (2025 16:16)  POCT Blood Glucose.: 319 mg/dL (2025 11:07)  POCT Blood Glucose.: 241 mg/dL (2025 07:36)  POCT Blood Glucose.: 254 mg/dL (2025 22:07)    Lipid panel:           Thyroid:  Diabetes Tests:  Parathyroid Panel:  Adrenals:  RADIOLOGY & ADDITIONAL TESTS:    Imaging Personally Reviewed:  [ ] YES  [ ] NO    Consultant(s) Notes Reviewed:  [ ] YES  [ ] NO    Care Discussed with Consultants/Other Providers [ ] YES  [ ] NO

## 2025-01-14 NOTE — CHART NOTE - NSCHARTNOTEFT_GEN_A_CORE
EVENT: Received telephone call from RN that pt is c/o of severe back pain whioch he rated as a 10/10    HPI: 59 years old male with h/o prediabetes present to ED with complain of generalized weakness, polydipsia, polyuria and LE swelling. Patient reported polydipsia and polyuria for 2 weeks, associated with LE swelling.   Hypertensive to 201/101, afebrile, sat well at [facility]. No leukocytosis, plt 252, glucose 581, Cr 2.15, Na 128 ( corrected Na for hyperglycemia 140), proBNP 1011. CXR with no focal consolidation. LE doppler with no DVT. EKG with NSR, QTc 406. Admitted for hyperglycemia w/u    SUBJECTIVE: "I took Tylenol earlier but it did not help my pain"      OBJECTIVE:  Vital Signs Last 24 Hrs  T(C): 37.7 (14 Jan 2025 17:18), Max: 37.7 (14 Jan 2025 17:18)  T(F): 99.8 (14 Jan 2025 17:18), Max: 99.8 (14 Jan 2025 17:18)  HR: 81 (14 Jan 2025 17:18) (69 - 81)  BP: 158/85 (14 Jan 2025 17:18) (158/85 - 179/81)  BP(mean): --  RR: 18 (14 Jan 2025 17:18) (17 - 18)  SpO2: 99% (14 Jan 2025 17:18) (99% - 100%)    Parameters below as of 14 Jan 2025 17:18  Patient On (Oxygen Delivery Method): room air        FOCUSED PHYSICAL EXAM:  Neuro: awake, alert, oriented x 3. No neuro deficit  Cardiovascular: Pulses +2 B/L in lower and upper extremities, HR regular, BP stable, No edema.  Respiratory: Respirations regular, unlabored, breath sounds clear B/L.   GI: Abdomen soft, non-tender, positive bowel sounds.  : no bladder distention noted. No complaints at this time.  Skin: Dry, intact, no bruising, no diaphoresis.    LABS:                        13.4   4.61  )-----------( 301      ( 14 Jan 2025 08:30 )             36.9     01-14    135  |  102  |  32[H]  ----------------------------<  266[H]  3.5   |  24  |  1.99[H]    Ca    9.7      14 Jan 2025 08:30  Phos  3.3     01-14  Mg     1.8     01-14    TPro  7.7  /  Alb  3.3  /  TBili  0.6  /  DBili  x   /  AST  31  /  ALT  37  /  AlkPhos  148[H]  01-14      EKG:   IMAGING:    ASSESSMENT/PROBLEM: Back pain      PLAN:   1. Morphine 2 mg, IVP x 1 dose ordered  2. Monitor response to treatment  3. Cont with Tylenol, PRN and Lidoderm patch  4. Cont present care/treatment  5. Supportive care    . Critical Care time spent was > than  35 mins assessing presenting problems of acute illness that poses high probability of life threatening deterioration or end organ damage/dysfunction.  Medical decision making including Initiating plan of care, reviewing data, reviewing radiology, direct patient bedside evaluation and interpretation of vital signs, and ordering medication.
EVENT: Received telephone call from RN that pt is c/o of nausea/vomiting    HPI: 59 years old male with h/o prediabetes present to ED with complain of generalized weakness, polydipsia, polyuria and LE swelling. Patient reported polydipsia and polyuria for 2 weeks, associated with LE swelling. No orthopnea. No chest pain or SOB. Hypertensive to 201/101, afebrile, sat well at RA. No leukocytosis, plt 252, glucose 581, Cr 2.15, Na 128 ( corrected Na for hyperglycemia 140), proBNP 1011. CXR with no focal consolidation. LE doppler with no DVT. EKG with NSR, QTc 406. Admitted for Severe hyperglycemia due to diabetes mellitus with significantly elevated glucose of 581    SUBJECTIVE: I need something to help me stop feeling this nausea"    OBJECTIVE:  Vital Signs Last 24 Hrs  T(C): 36.5 (13 Jan 2025 21:25), Max: 36.7 (13 Jan 2025 04:29)  T(F): 97.7 (13 Jan 2025 21:25), Max: 98.1 (13 Jan 2025 04:29)  HR: 79 (13 Jan 2025 22:17) (58 - 85)  BP: 173/94 (13 Jan 2025 22:17) (150/74 - 201/101)  BP(mean): 99 (13 Jan 2025 07:50) (99 - 99)  RR: 18 (13 Jan 2025 21:25) (17 - 18)  SpO2: 100% (13 Jan 2025 21:25) (96% - 100%)    Parameters below as of 13 Jan 2025 21:25  Patient On (Oxygen Delivery Method): room air      FOCUSED PHYSICAL EXAM:  Neuro: awake, alert, oriented x 3. No neuro deficit  Cardiovascular: Pulses +2 B/L in lower and upper extremities, HR regular, BP stable, No edema.  Respiratory: Respirations regular, unlabored, breath sounds clear B/L.   GI: Abdomen soft, non-tender, positive bowel sounds.  : no bladder distention noted. No complaints at this time.  Skin: Dry, intact, no bruising, no diaphoresis.    LABS:                        12.5   4.16  )-----------( 252      ( 13 Jan 2025 02:47 )             34.2     01-13    128[L]  |  95[L]  |  32[H]  ----------------------------<  581[HH]  3.9   |  24  |  2.15[H]    Ca    9.0      13 Jan 2025 02:47    TPro  7.8  /  Alb  3.4  /  TBili  0.6  /  DBili  x   /  AST  32  /  ALT  44  /  AlkPhos  162[H]  01-13      EKG:   IMAGING:    ASSESSMENT/PROBLEM: Nausea/vomiting      PLAN:   1. Zofran 4 mg, IVP x 1 dose ordered  2. Monitor response to treatment  3. Cont present care/treatment  4. Supportive care    . Critical Care time spent was > than  35 mins assessing presenting problems of acute illness that poses high probability of life threatening deterioration or end organ damage/dysfunction.  Medical decision making including Initiating plan of care, reviewing data, reviewing radiology, direct patient bedside evaluation and interpretation of vital signs, and ordering medications.

## 2025-01-15 LAB
A1C WITH ESTIMATED AVERAGE GLUCOSE RESULT: >15.5 % — HIGH (ref 4–5.6)
ESTIMATED AVERAGE GLUCOSE: >398 MG/DL — HIGH (ref 68–114)
GLUCOSE BLDC GLUCOMTR-MCNC: 232 MG/DL — HIGH (ref 70–99)
GLUCOSE BLDC GLUCOMTR-MCNC: 252 MG/DL — HIGH (ref 70–99)
GLUCOSE BLDC GLUCOMTR-MCNC: 256 MG/DL — HIGH (ref 70–99)
GLUCOSE BLDC GLUCOMTR-MCNC: 279 MG/DL — HIGH (ref 70–99)

## 2025-01-15 PROCEDURE — 99239 HOSP IP/OBS DSCHRG MGMT >30: CPT

## 2025-01-15 PROCEDURE — 72110 X-RAY EXAM L-2 SPINE 4/>VWS: CPT | Mod: 26

## 2025-01-15 PROCEDURE — 72074 X-RAY EXAM THORAC SPINE4/>VW: CPT | Mod: 26

## 2025-01-15 RX ORDER — ISOPROPYL ALCOHOL, BENZOCAINE .7; .06 ML/ML; ML/ML
0 SWAB TOPICAL
Qty: 100 | Refills: 1
Start: 2025-01-15

## 2025-01-15 RX ORDER — NIFEDIPINE 60 MG/1
1 TABLET, EXTENDED RELEASE ORAL
Qty: 30 | Refills: 0
Start: 2025-01-15 | End: 2025-02-13

## 2025-01-15 RX ORDER — INSULIN LISPRO 100/ML
6 VIAL (ML) SUBCUTANEOUS
Qty: 1 | Refills: 0
Start: 2025-01-15 | End: 2025-02-13

## 2025-01-15 RX ORDER — INSULIN GLARGINE-YFGN 100 [IU]/ML
13 INJECTION, SOLUTION SUBCUTANEOUS
Qty: 1 | Refills: 0
Start: 2025-01-15 | End: 2025-02-13

## 2025-01-15 RX ADMIN — INSULIN GLARGINE-YFGN 13 UNIT(S): 100 INJECTION, SOLUTION SUBCUTANEOUS at 08:26

## 2025-01-15 RX ADMIN — ACETAMINOPHEN 650 MILLIGRAM(S): 80 SOLUTION/ DROPS ORAL at 11:37

## 2025-01-15 RX ADMIN — Medication 3: at 08:24

## 2025-01-15 RX ADMIN — Medication 3: at 11:36

## 2025-01-15 RX ADMIN — Medication 6 UNIT(S): at 11:37

## 2025-01-15 RX ADMIN — NIFEDIPINE 60 MILLIGRAM(S): 60 TABLET, EXTENDED RELEASE ORAL at 08:26

## 2025-01-15 RX ADMIN — LIDOCAINE 1 PATCH: 50 OINTMENT TOPICAL at 08:09

## 2025-01-15 RX ADMIN — LIDOCAINE 1 PATCH: 50 OINTMENT TOPICAL at 08:10

## 2025-01-15 RX ADMIN — ACETAMINOPHEN 650 MILLIGRAM(S): 80 SOLUTION/ DROPS ORAL at 12:37

## 2025-01-15 RX ADMIN — Medication 6 UNIT(S): at 08:25

## 2025-01-15 RX ADMIN — Medication 3: at 16:44

## 2025-01-15 RX ADMIN — Medication 6 UNIT(S): at 16:44

## 2025-01-15 NOTE — PROGRESS NOTE ADULT - SUBJECTIVE AND OBJECTIVE BOX
Patient is a 59y old  Male who presents with a chief complaint of DM with hyperglycemia, OLGA, hypertensive urgency (15 Jozef 2025 15:39)      Interval History: finger sticks are in 200s   Creatinine still 1.99   on Lantus 13 units and prandial lispro 6 units   Nutrition adequate     MEDICATIONS  (STANDING):  dextrose 5%. 1000 milliLiter(s) (100 mL/Hr) IV Continuous <Continuous>  dextrose 5%. 1000 milliLiter(s) (50 mL/Hr) IV Continuous <Continuous>  dextrose 50% Injectable 25 Gram(s) IV Push once  dextrose 50% Injectable 12.5 Gram(s) IV Push once  dextrose 50% Injectable 25 Gram(s) IV Push once  glucagon  Injectable 1 milliGRAM(s) IntraMuscular once  heparin   Injectable 5000 Unit(s) SubCutaneous every 8 hours  insulin glargine Injectable (LANTUS) 13 Unit(s) SubCutaneous every morning  insulin lispro (ADMELOG) corrective regimen sliding scale   SubCutaneous three times a day before meals  insulin lispro (ADMELOG) corrective regimen sliding scale   SubCutaneous at bedtime  insulin lispro Injectable (ADMELOG) 6 Unit(s) SubCutaneous three times a day before meals  lactated ringers. 1000 milliLiter(s) (100 mL/Hr) IV Continuous <Continuous>  NIFEdipine XL 60 milliGRAM(s) Oral daily    MEDICATIONS  (PRN):  acetaminophen     Tablet .. 650 milliGRAM(s) Oral every 6 hours PRN Mild Pain (1 - 3), Moderate Pain (4 - 6)  dextrose Oral Gel 15 Gram(s) Oral once PRN Blood Glucose LESS THAN 70 milliGRAM(s)/deciliter  melatonin 3 milliGRAM(s) Oral at bedtime PRN Insomnia      Allergies    No Known Allergies    Intolerances        REVIEW OF SYSTEMS:  CONSTITUTIONAL: no changes  EYES: No eye pain, visual disturbances, or discharge  ENMT:  No difficulty hearing, No sinus or throat pain  NECK: No pain or stiffness  RESPIRATORY: No cough, wheezing, chills or hemoptysis; No shortness of breath  CARDIOVASCULAR: No chest pain, palpitations or leg swelling  GASTROINTESTINAL: No abdominal or epigastric pain. No nausea, vomiting, or hematemesis; No diarrhea or constipation. No melena or hematochezia.  GENITOURINARY: No dysuria, frequency, hematuria, or incontinence  NEUROLOGICAL: No headaches, memory loss, loss of strength, numbness, or tremors  SKIN: No itching, burning, rashes, or lesions   ENDOCRINE: No heat or cold intolerance; No hair loss  MUSCULOSKELETAL: No joint pain or swelling; No muscle, back, or extremity pain  PSYCHIATRIC: No depression, anxiety, mood swings, or difficulty sleeping  HEME/LYMPH: No easy bruising, or bleeding gums  ALLERY AND IMMUNOLOGIC: No hives or eczema    Vital Signs Last 24 Hrs  T(C): 37.1 (15 Jozef 2025 17:17), Max: 37.1 (15 Jozef 2025 17:17)  T(F): 98.7 (15 Jozef 2025 17:17), Max: 98.7 (15 Jozef 2025 17:17)  HR: 78 (15 Jozef 2025 17:17) (66 - 78)  BP: 146/79 (15 Jozef 2025 17:17) (132/76 - 156/85)  BP(mean): --  RR: 18 (15 Jozef 2025 17:17) (17 - 18)  SpO2: 99% (15 Jozef 2025 17:17) (99% - 100%)    Parameters below as of 15 Jozef 2025 17:17  Patient On (Oxygen Delivery Method): room air        PHYSICAL EXAM:  GENERAL:   HEAD: Atraumatic, Normocephalic  EYES: PERRLA, conjunctiva and sclera clear  ENMT: No  exudates,; Moist mucous membranes,, No lesions  NECK: Supple, No JVD, Normal thyroid  NERVOUS SYSTEM:  Alert & Oriented,   CHEST/LUNG: Clear to auscultation bilaterally; No rales, rhonchi, wheezing, or rubs  HEART: Regular rate and rhythm; No murmurs, rubs, or gallops  ABDOMEN: Soft, Nontender, Nondistended; Bowel sounds present  EXTREMITIES:  2+ Peripheral Pulses, no edema  SKIN: No rashes or lesions    LABS:      Urinalysis Basic - ( 2025 08:56 )    Color: Yellow / Appearance: Clear / S.010 / pH: x  Gluc: x / Ketone: Negative mg/dL  / Bili: Negative / Urobili: 0.2 mg/dL   Blood: x / Protein: Trace mg/dL / Nitrite: Negative   Leuk Esterase: Negative / RBC: 1 /HPF / WBC 1 /HPF   Sq Epi: x / Non Sq Epi: x / Bacteria: Few /HPF      CAPILLARY BLOOD GLUCOSE      POCT Blood Glucose.: 232 mg/dL (15 Jozef 2025 21:17)  POCT Blood Glucose.: 279 mg/dL (15 Jozef 2025 16:03)  POCT Blood Glucose.: 256 mg/dL (15 Jozef 2025 11:07)  POCT Blood Glucose.: 252 mg/dL (15 Jozef 2025 07:55)    Lipid panel:           Thyroid:  Diabetes Tests:  Parathyroid Panel:  Adrenals:  RADIOLOGY & ADDITIONAL TESTS:    Imaging Personally Reviewed:  [ ] YES  [ ] NO    Consultant(s) Notes Reviewed:  [ ] YES  [ ] NO    Care Discussed with Consultants/Other Providers [ ] YES  [ ] NO

## 2025-01-15 NOTE — DIETITIAN INITIAL EVALUATION ADULT - PERTINENT MEDS FT
MEDICATIONS  (STANDING):  dextrose 5%. 1000 milliLiter(s) (100 mL/Hr) IV Continuous <Continuous>  dextrose 5%. 1000 milliLiter(s) (50 mL/Hr) IV Continuous <Continuous>  dextrose 50% Injectable 25 Gram(s) IV Push once  dextrose 50% Injectable 12.5 Gram(s) IV Push once  dextrose 50% Injectable 25 Gram(s) IV Push once  glucagon  Injectable 1 milliGRAM(s) IntraMuscular once  heparin   Injectable 5000 Unit(s) SubCutaneous every 8 hours  insulin glargine Injectable (LANTUS) 13 Unit(s) SubCutaneous every morning  insulin lispro (ADMELOG) corrective regimen sliding scale   SubCutaneous three times a day before meals  insulin lispro (ADMELOG) corrective regimen sliding scale   SubCutaneous at bedtime  insulin lispro Injectable (ADMELOG) 6 Unit(s) SubCutaneous three times a day before meals  lactated ringers. 1000 milliLiter(s) (100 mL/Hr) IV Continuous <Continuous>  NIFEdipine XL 60 milliGRAM(s) Oral daily    MEDICATIONS  (PRN):  acetaminophen     Tablet .. 650 milliGRAM(s) Oral every 6 hours PRN Mild Pain (1 - 3), Moderate Pain (4 - 6)  dextrose Oral Gel 15 Gram(s) Oral once PRN Blood Glucose LESS THAN 70 milliGRAM(s)/deciliter  melatonin 3 milliGRAM(s) Oral at bedtime PRN Insomnia

## 2025-01-15 NOTE — DISCHARGE NOTE PROVIDER - HOSPITAL COURSE
59 years old male with h/o prediabetes present to ED with complain of generalized weakness, polydipsia, polyuria and LE swelling. Admitted for New onset DM w/ uncontrolled hyperglycemia, jorge a and htn urgency. Patient found to have elevated HgA1C and started on insulin. Patient is medically optimized for discharge to home.

## 2025-01-15 NOTE — DIETITIAN INITIAL EVALUATION ADULT - OTHER INFO
Pt with new onset DM. Endocrinology following. Provided pt with extensive DM nutrition counseling/educational materials; discussed CHO foods and beverages with their recommended portion sizes; plate method; BG and A1c goals. Pt with new onset DM; A1c>15.5%. Endocrinology following. Provided pt with extensive DM nutrition counseling/educational materials; discussed CHO foods and beverages with their recommended portion sizes; plate method; BG and A1c goals.

## 2025-01-15 NOTE — DIETITIAN INITIAL EVALUATION ADULT - NUTRITIONGOAL OUTCOME1
Pt to meet >75% of protein-energy needs via meals/supplements; Improved glycemic control(140-180 mg/dL)

## 2025-01-15 NOTE — DIETITIAN INITIAL EVALUATION ADULT - NS FNS DIET ORDER
Diet, DASH/TLC:   Sodium & Cholesterol Restricted  Consistent Carbohydrate {No Snacks} (01-13-25 @ 10:30) [Active]

## 2025-01-15 NOTE — PROGRESS NOTE ADULT - PROBLEM SELECTOR PLAN 1
Continue with the current  regimen while inpatient   for now no Insulin sensitizers   while inpatient, finger sticks should be 100-180
Continue with the current  regimen while inpatient   while inpatient, finger sticks should be 100-180   decreased glucose toxicity

## 2025-01-15 NOTE — DIETITIAN INITIAL EVALUATION ADULT - ENERGY INTAKE
Adequate (%) Continues with good appetite and PO intake. Denies any chew/swallowing difficulty or any current N/V/D/C.

## 2025-01-15 NOTE — PROGRESS NOTE ADULT - NUTRITIONAL ASSESSMENT
This patient has been assessed with a concern for Malnutrition and has been determined to have a diagnosis/diagnoses of Moderate protein-calorie malnutrition.    This patient is being managed with:   Diet DASH/TLC-  Sodium & Cholesterol Restricted  Consistent Carbohydrate {No Snacks}  Entered: Jan 13 2025 10:30AM

## 2025-01-15 NOTE — DIETITIAN INITIAL EVALUATION ADULT - ORAL INTAKE PTA/DIET HISTORY
Pt reports good appetite and good PO intake PTA. Does not follow any specific dietary restrictions PTA, and per diet recall, pt regularly eats high CHO foods and drinks soda(especially ginger ale). Reports he has gradually lost weight over past 2 years which was mostly intentional (from 220# down to 175#), however states last couple months he has lost ~12# unintentionally and now weighs 163 lbs.

## 2025-01-15 NOTE — PROGRESS NOTE ADULT - SUBJECTIVE AND OBJECTIVE BOX
Patient is a 59y old  Male who presents with a chief complaint of ACUTE RENAL INJURY; ACUTE HYPERGLYCEMIA (15 Jozef 2025 10:31)    INTERVAL HPI/OVERNIGHT EVENTS: No acute events overnight. Patient continues to endorse severe back pain.     MEDICATIONS  (STANDING):  dextrose 5%. 1000 milliLiter(s) (100 mL/Hr) IV Continuous <Continuous>  dextrose 5%. 1000 milliLiter(s) (50 mL/Hr) IV Continuous <Continuous>  dextrose 50% Injectable 25 Gram(s) IV Push once  dextrose 50% Injectable 12.5 Gram(s) IV Push once  dextrose 50% Injectable 25 Gram(s) IV Push once  glucagon  Injectable 1 milliGRAM(s) IntraMuscular once  heparin   Injectable 5000 Unit(s) SubCutaneous every 8 hours  insulin glargine Injectable (LANTUS) 13 Unit(s) SubCutaneous every morning  insulin lispro (ADMELOG) corrective regimen sliding scale   SubCutaneous three times a day before meals  insulin lispro (ADMELOG) corrective regimen sliding scale   SubCutaneous at bedtime  insulin lispro Injectable (ADMELOG) 6 Unit(s) SubCutaneous three times a day before meals  lactated ringers. 1000 milliLiter(s) (100 mL/Hr) IV Continuous <Continuous>  NIFEdipine XL 60 milliGRAM(s) Oral daily    MEDICATIONS  (PRN):  acetaminophen     Tablet .. 650 milliGRAM(s) Oral every 6 hours PRN Mild Pain (1 - 3), Moderate Pain (4 - 6)  dextrose Oral Gel 15 Gram(s) Oral once PRN Blood Glucose LESS THAN 70 milliGRAM(s)/deciliter  melatonin 3 milliGRAM(s) Oral at bedtime PRN Insomnia      Allergies    No Known Allergies    Intolerances        REVIEW OF SYSTEMS: all negative with exception of above    Vital Signs Last 24 Hrs  T(C): 36.7 (15 Jozef 2025 11:02), Max: 37.7 (2025 17:18)  T(F): 98 (15 Jozef 2025 11:02), Max: 99.8 (2025 17:18)  HR: 70 (15 Jozef 2025 11:02) (66 - 81)  BP: 156/85 (15 Jozef 2025 11:02) (132/76 - 158/85)  BP(mean): --  RR: 17 (15 Jozef 2025 11:02) (17 - 18)  SpO2: 99% (15 Jozef 2025 11:02) (99% - 100%)    Parameters below as of 15 Jozef 2025 11:02  Patient On (Oxygen Delivery Method): room air    PHYSICAL EXAM:  GENERAL: NAD, well-groomed  NERVOUS SYSTEM:  Alert & Oriented X3, Good concentration; Motor Strength 5/5 B/L upper and lower extremities; DTRs 2+ intact and symmetric  CHEST/LUNG: Clear to percussion bilaterally; No rales, rhonchi, wheezing, or rubs  HEART: Regular rate and rhythm; No murmurs, rubs, or gallops  ABDOMEN: Soft, Nontender, Nondistended; Bowel sounds present  EXTREMITIES:  2+ Peripheral Pulses, No clubbing, cyanosis, or edema    LABS:                        13.4   4.61  )-----------( 301      ( 2025 08:30 )             36.9         135  |  102  |  32[H]  ----------------------------<  266[H]  3.5   |  24  |  1.99[H]    Ca    9.7      2025 08:30  Phos  3.3       Mg     1.8         TPro  7.7  /  Alb  3.3  /  TBili  0.6  /  DBili  x   /  AST  31  /  ALT  37  /  AlkPhos  148[H]  14      Urinalysis Basic - ( 2025 08:56 )    Color: Yellow / Appearance: Clear / S.010 / pH: x  Gluc: x / Ketone: Negative mg/dL  / Bili: Negative / Urobili: 0.2 mg/dL   Blood: x / Protein: Trace mg/dL / Nitrite: Negative   Leuk Esterase: Negative / RBC: 1 /HPF / WBC 1 /HPF   Sq Epi: x / Non Sq Epi: x / Bacteria: Few /HPF      CAPILLARY BLOOD GLUCOSE      POCT Blood Glucose.: 256 mg/dL (15 Jozef 2025 11:07)  POCT Blood Glucose.: 252 mg/dL (15 Jozef 2025 07:55)  POCT Blood Glucose.: 294 mg/dL (2025 21:03)  POCT Blood Glucose.: 360 mg/dL (2025 16:16)      RADIOLOGY & ADDITIONAL TESTS:    Imaging Personally Reviewed:  [ ] YES  [ ] NO    Consultant(s) Notes Reviewed:  [ ] YES  [ ] NO    Care Discussed with Consultants/Other Providers [ ] YES  [ ] NO

## 2025-01-15 NOTE — DIETITIAN INITIAL EVALUATION ADULT - PERTINENT LABORATORY DATA
01-14    135  |  102  |  32[H]  ----------------------------<  266[H]  3.5   |  24  |  1.99[H]    Ca    9.7      14 Jan 2025 08:30  Phos  3.3     01-14  Mg     1.8     01-14    TPro  7.7  /  Alb  3.3  /  TBili  0.6  /  DBili  x   /  AST  31  /  ALT  37  /  AlkPhos  148[H]  01-14  POCT Blood Glucose.: 252 mg/dL (01-15-25 @ 07:55)  A1C with Estimated Average Glucose Result: >15.5 % (01-14-25 @ 08:30)

## 2025-01-15 NOTE — DISCHARGE NOTE PROVIDER - NSDCMRMEDTOKEN_GEN_ALL_CORE_FT
Admelog 100 units/mL injectable solution: 6 unit(s) subcutaneous 3 times a day (before meals) unit(s) subcutaneous 3 times a day (with meals)  alcohol swabs: Apply topically to affected area 4 times a day  Basaglar KwikPen 100 units/mL subcutaneous solution: 13 unit(s) subcutaneous once a day (at bedtime) unit(s) subcutaneous once a day  glucometer (per patient&#x27;s insurance): Test blood sugars four times a day. Dispense #1 glucometer.  Insulin Pen Needles, 4mm: 1 application subcutaneously 4 times a day. ** Use with insulin pen **  lancets: 1 application subcutaneously 4 times a day  NIFEdipine 60 mg oral tablet, extended release: 1 tab(s) orally once a day  test strips (per patient&#x27;s insurance): 1 application subcutaneously 4 times a day. ** Compatible with patient&#x27;s glucometer **   alcohol swabs: Apply topically to affected area 4 times a day  glucometer (per patient&#x27;s insurance): Test blood sugars four times a day. Dispense #1 glucometer.  Insulin Glargine Solostar Pen 100 units/mL subcutaneous solution: 13 unit(s) subcutaneous once a day  Insulin Lispro KwikPen 100 units/mL injectable solution: 6 unit(s) injectable 3 times a day  Insulin Pen Needles, 4mm: 1 application subcutaneously 4 times a day. ** Use with insulin pen **  lancets: 1 application subcutaneously 4 times a day  NIFEdipine 60 mg oral tablet, extended release: 1 tab(s) orally once a day  test strips (per patient&#x27;s insurance): 1 application subcutaneously 4 times a day. ** Compatible with patient&#x27;s glucometer **

## 2025-01-15 NOTE — DISCHARGE NOTE PROVIDER - CARE PROVIDER_API CALL
Aflredo Chacon  Endocrinology/Metab/Diabetes  [address], [address]  [place] [address]  Phone: [phone]  Fax: [phone]  Follow Up Time:

## 2025-01-15 NOTE — DIETITIAN INITIAL EVALUATION ADULT - PROBLEM SELECTOR PROBLEM 4
Elevated brain natriuretic peptide (BNP) level
What Is The Reason For Today's Visit?: Full Body Skin Examination
What Is The Reason For Today's Visit? (Being Monitored For X): the risk of recurrence of previously treated lesion(s)

## 2025-01-15 NOTE — DISCHARGE NOTE PROVIDER - NSDCCPCAREPLAN_GEN_ALL_CORE_FT
PRINCIPAL DISCHARGE DIAGNOSIS  Diagnosis: Severe hyperglycemia due to diabetes mellitus  Assessment and Plan of Treatment:       SECONDARY DISCHARGE DIAGNOSES  Diagnosis: Elevated brain natriuretic peptide (BNP) level  Assessment and Plan of Treatment:     Diagnosis: Acute hyperglycemia  Assessment and Plan of Treatment:     Diagnosis: Back pain  Assessment and Plan of Treatment: Xray unremarkable. Please follow up with your outpatient PCP for further testing including discussing prostate screening

## 2025-01-15 NOTE — DISCHARGE NOTE PROVIDER - ATTENDING DISCHARGE PHYSICAL EXAMINATION:
Vital Signs Last 24 Hrs  T(F): 98 (15 Jozef 2025 11:02), Max: 99.8 (14 Jan 2025 17:18)  HR: 70 (15 Jozef 2025 11:02) (66 - 81)  BP: 156/85 (15 Jozef 2025 11:02) (132/76 - 158/85)  RR: 17 (15 Jozef 2025 11:02) (17 - 18)  SpO2: 99% (15 Jozef 2025 11:02) (99% - 100%)    Physical Exam:  Constitutional: NAD, awake and alert, well-developed  Neck: Soft and supple, No LAD, No JVD  Respiratory: cta b/l no wheezing no rhonchi  Cardiovascular: +s1/s2 no edema b/l le  Gastrointestinal: soft nt nd bs+  Vascular: 2+ peripheral pulses  Neurological: A/O x 3, no focal deficits

## 2025-01-16 VITALS
SYSTOLIC BLOOD PRESSURE: 168 MMHG | TEMPERATURE: 98 F | HEART RATE: 60 BPM | OXYGEN SATURATION: 100 % | RESPIRATION RATE: 18 BRPM | DIASTOLIC BLOOD PRESSURE: 63 MMHG

## 2025-01-16 LAB
GLUCOSE BLDC GLUCOMTR-MCNC: 230 MG/DL — HIGH (ref 70–99)
GLUCOSE BLDC GLUCOMTR-MCNC: 308 MG/DL — HIGH (ref 70–99)
GLUCOSE BLDC GLUCOMTR-MCNC: 335 MG/DL — HIGH (ref 70–99)
PSA FLD-MCNC: 2.57 NG/ML — SIGNIFICANT CHANGE UP (ref 0–4)

## 2025-01-16 PROCEDURE — 99232 SBSQ HOSP IP/OBS MODERATE 35: CPT

## 2025-01-16 RX ORDER — ISOPROPYL ALCOHOL, BENZOCAINE .7; .06 ML/ML; ML/ML
0 SWAB TOPICAL
Qty: 100 | Refills: 1
Start: 2025-01-16

## 2025-01-16 RX ORDER — INSULIN LISPRO 100/ML
6 VIAL (ML) SUBCUTANEOUS
Qty: 3 | Refills: 0
Start: 2025-01-16 | End: 2025-02-14

## 2025-01-16 RX ORDER — INSULIN GLARGINE-YFGN 100 [IU]/ML
13 INJECTION, SOLUTION SUBCUTANEOUS
Qty: 3 | Refills: 0
Start: 2025-01-16 | End: 2025-02-14

## 2025-01-16 RX ADMIN — Medication 4: at 11:55

## 2025-01-16 RX ADMIN — NIFEDIPINE 60 MILLIGRAM(S): 60 TABLET, EXTENDED RELEASE ORAL at 08:36

## 2025-01-16 RX ADMIN — INSULIN GLARGINE-YFGN 13 UNIT(S): 100 INJECTION, SOLUTION SUBCUTANEOUS at 08:39

## 2025-01-16 RX ADMIN — Medication 2: at 16:40

## 2025-01-16 RX ADMIN — Medication 6 UNIT(S): at 16:41

## 2025-01-16 RX ADMIN — Medication 6 UNIT(S): at 08:36

## 2025-01-16 RX ADMIN — ACETAMINOPHEN 650 MILLIGRAM(S): 80 SOLUTION/ DROPS ORAL at 02:49

## 2025-01-16 RX ADMIN — Medication 4: at 08:36

## 2025-01-16 RX ADMIN — Medication 6 UNIT(S): at 11:56

## 2025-01-16 RX ADMIN — ACETAMINOPHEN 650 MILLIGRAM(S): 80 SOLUTION/ DROPS ORAL at 02:19

## 2025-01-16 NOTE — PROGRESS NOTE ADULT - ASSESSMENT
59 years old male with h/o prediabetes present to ED with complain of generalized weakness, polydipsia, polyuria and LE swelling. Admitted for New onset DM w/ uncontrolled hyperglycemia, olga and htn urgency     A/P:  #HTN Urgency   #New onset DM w/ uncontrolled hyperglycemia   #OLGA- improving   #B/l LE swelling- improved   #DVT ppx     Plan:  -BP elevated, start Nifedipine 60mg XL OD. Monitor for response would likely need second agent   -Blood sugars uncontrolled, inc lantus to 13 units, inc pre-meals to 6 units TID and x/w hss. Follow HbA1C. Patient refused insulin as out-patient as he cannot inject himself and prefers PO agents upon DC. Will discuss w/ endo on service, pending a1C   - F/u PSA level outpatient  - XRay LS reviewed  -Scr improved   -Pt noted w/ b/l le edema, US negative for dvt. ECHO w/ normal EF. No pitting edema at this time, not need for diuretics.  -Dispo in progress 
59 years old male with h/o prediabetes present to ED with complain of generalized weakness, polydipsia, polyuria and LE swelling. Admitted for New onset DM w/ uncontrolled hyperglycemia, olga and htn urgency     A/P:  #HTN Urgency   #New onset DM w/ uncontrolled hyperglycemia   #OLGA- improving   #B/l LE swelling- improved   #DVT ppx     Plan:  -BP elevated, start Nifedipine 60mg XL OD. Monitor for response would likely need second agent   -Blood sugars uncontrolled, inc lantus to 13 units, inc pre-meals to 6 units TID and x/w hss. Follow HbA1C. Patient refused insulin as out-patient as he cannot inject himself and prefers PO agents upon DC. Will discuss w/ endo on service, pending a1C   - F/u Xray LS spine and PSA level  -Scr improved   -Pt noted w/ b/l le edema, US negative for dvt. ECHO w/ normal EF. No pitting edema at this time, not need for diuretics.  -Dispo in progress 
59 years old male with h/o prediabetes present to ED with complain of generalized weakness, polydipsia, polyuria and LE swelling. Admitted for New onset DM w/ uncontrolled hyperglycemia, olga and htn urgency     A/P:  #HTN Urgency   #New onset DM w/ uncontrolled hyperglycemia   #OLGA- improving   #B/l LE swelling- improved   #DVT ppx     Plan:  -BP elevated, start Nifedipine 60mg XL OD. Monitor for response would likely need second agent   -Blood sugars uncontrolled, inc lantus to 13 units, inc pre-meals to 6 units TID and x/w hss. Follow HbA1C. Patient refused insulin as out-patient as he cannot inject himself and prefers PO agents upon DC. Will discuss w/ endo on service, pending a1C   -Scr improved   -Pt noted w/ b/l le edema, US negative for dvt. ECHO w/ normal EF. No pitting edema at this time, not need for diuretics.  -Dispo in progress 
diabetes 
diabetes

## 2025-01-16 NOTE — PROGRESS NOTE ADULT - SUBJECTIVE AND OBJECTIVE BOX
Patient is a 59y old  Male who presents with a chief complaint of DM with hyperglycemia, OLGA, hypertensive urgency (15 Jozef 2025 22:19)      INTERVAL HPI/OVERNIGHT EVENTS:    MEDICATIONS  (STANDING):  dextrose 5%. 1000 milliLiter(s) (100 mL/Hr) IV Continuous <Continuous>  dextrose 5%. 1000 milliLiter(s) (50 mL/Hr) IV Continuous <Continuous>  dextrose 50% Injectable 25 Gram(s) IV Push once  dextrose 50% Injectable 12.5 Gram(s) IV Push once  dextrose 50% Injectable 25 Gram(s) IV Push once  glucagon  Injectable 1 milliGRAM(s) IntraMuscular once  heparin   Injectable 5000 Unit(s) SubCutaneous every 8 hours  insulin glargine Injectable (LANTUS) 13 Unit(s) SubCutaneous every morning  insulin lispro (ADMELOG) corrective regimen sliding scale   SubCutaneous three times a day before meals  insulin lispro (ADMELOG) corrective regimen sliding scale   SubCutaneous at bedtime  insulin lispro Injectable (ADMELOG) 6 Unit(s) SubCutaneous three times a day before meals  lactated ringers. 1000 milliLiter(s) (100 mL/Hr) IV Continuous <Continuous>  NIFEdipine XL 60 milliGRAM(s) Oral daily    MEDICATIONS  (PRN):  acetaminophen     Tablet .. 650 milliGRAM(s) Oral every 6 hours PRN Mild Pain (1 - 3), Moderate Pain (4 - 6)  dextrose Oral Gel 15 Gram(s) Oral once PRN Blood Glucose LESS THAN 70 milliGRAM(s)/deciliter  melatonin 3 milliGRAM(s) Oral at bedtime PRN Insomnia      Allergies    No Known Allergies    Intolerances        REVIEW OF SYSTEMS: all negative with exception of above    Vital Signs Last 24 Hrs  T(C): 37 (16 Jan 2025 12:16), Max: 37.1 (15 Jozef 2025 17:17)  T(F): 98.6 (16 Jan 2025 12:16), Max: 98.8 (16 Jan 2025 00:23)  HR: 69 (16 Jan 2025 12:16) (68 - 78)  BP: 175/90 (16 Jan 2025 12:16) (145/84 - 175/90)  BP(mean): --  RR: 18 (16 Jan 2025 12:16) (18 - 18)  SpO2: 95% (16 Jan 2025 12:16) (95% - 100%)    Parameters below as of 16 Jan 2025 12:16  Patient On (Oxygen Delivery Method): room air    PHYSICAL EXAM:  GENERAL: NAD, well-groomed  NERVOUS SYSTEM:  Alert & Oriented X3, Good concentration; Motor Strength 5/5 B/L upper and lower extremities; DTRs 2+ intact and symmetric  CHEST/LUNG: Clear to percussion bilaterally; No rales, rhonchi, wheezing, or rubs  HEART: Regular rate and rhythm; No murmurs, rubs, or gallops  ABDOMEN: Soft, Nontender, Nondistended; Bowel sounds present  EXTREMITIES:  2+ Peripheral Pulses, No clubbing, cyanosis, or edema    LABS:              CAPILLARY BLOOD GLUCOSE      POCT Blood Glucose.: 308 mg/dL (16 Jan 2025 11:25)  POCT Blood Glucose.: 335 mg/dL (16 Jan 2025 07:54)  POCT Blood Glucose.: 232 mg/dL (15 Jozef 2025 21:17)  POCT Blood Glucose.: 279 mg/dL (15 Jozef 2025 16:03)      RADIOLOGY & ADDITIONAL TESTS:    Imaging Personally Reviewed:  [ ] YES  [ ] NO  < from: Xray Lumbosacral Spine + Obliques (01.15.25 @ 13:38) >  IMPRESSION:    No acute radiographic findings, if there is continued clinical concern   follow-up CT or MRI can be ordered    --- End of Report ---            MICHELLE RIVERA DO  This document has been electronically signed. Jozef 15 2025  2:54PM    < end of copied text >        Consultant(s) Notes Reviewed:  [ ] YES  [ ] NO    Care Discussed with Consultants/Other Providers [ ] YES  [ ] NO

## 2025-01-16 NOTE — DISCHARGE NOTE NURSING/CASE MANAGEMENT/SOCIAL WORK - NSDCPEFALRISK_GEN_ALL_CORE
For information on Fall & Injury Prevention, visit: [email or web address] OR  [email or web address] OR  https://www.cdc.gov/steadi/patient.html

## 2025-01-16 NOTE — DISCHARGE NOTE NURSING/CASE MANAGEMENT/SOCIAL WORK - PATIENT PORTAL LINK FT
You can access the FollowMyHealth Patient Portal offered by [facility] by registering at the following website: [email or web address]. By joining [employer]’s FollowMyHealth portal, you will also be able to view your health information using other applications (apps) compatible with our system.

## 2025-01-16 NOTE — PROGRESS NOTE ADULT - REASON FOR ADMISSION
DM with hyperglycemia, OLGA, hypertensive urgency

## 2025-01-16 NOTE — DISCHARGE NOTE NURSING/CASE MANAGEMENT/SOCIAL WORK - FINANCIAL ASSISTANCE
[facility] provides services at a reduced cost to those who are determined to be eligible through [facility]’s financial assistance program. Information regarding [facility]’s financial assistance program can be found by going to [email or web address] or by calling [phone].

## 2025-01-21 DIAGNOSIS — M79.89 OTHER SPECIFIED SOFT TISSUE DISORDERS: ICD-10-CM

## 2025-01-21 DIAGNOSIS — R79.89 OTHER SPECIFIED ABNORMAL FINDINGS OF BLOOD CHEMISTRY: ICD-10-CM

## 2025-01-21 DIAGNOSIS — I10 ESSENTIAL (PRIMARY) HYPERTENSION: ICD-10-CM

## 2025-01-21 DIAGNOSIS — E11.65 TYPE 2 DIABETES MELLITUS WITH HYPERGLYCEMIA: ICD-10-CM

## 2025-01-21 DIAGNOSIS — I16.0 HYPERTENSIVE URGENCY: ICD-10-CM

## 2025-01-21 DIAGNOSIS — Z79.899 OTHER LONG TERM (CURRENT) DRUG THERAPY: ICD-10-CM

## 2025-01-21 DIAGNOSIS — Z79.1 LONG TERM (CURRENT) USE OF NON-STEROIDAL ANTI-INFLAMMATORIES (NSAID): ICD-10-CM

## 2025-01-21 DIAGNOSIS — N17.9 ACUTE KIDNEY FAILURE, UNSPECIFIED: ICD-10-CM
